# Patient Record
Sex: FEMALE | Race: BLACK OR AFRICAN AMERICAN | NOT HISPANIC OR LATINO | URBAN - METROPOLITAN AREA
[De-identification: names, ages, dates, MRNs, and addresses within clinical notes are randomized per-mention and may not be internally consistent; named-entity substitution may affect disease eponyms.]

---

## 2020-05-02 ENCOUNTER — EMERGENCY (EMERGENCY)
Facility: HOSPITAL | Age: 65
LOS: 1 days | Discharge: HOME | End: 2020-05-02
Attending: EMERGENCY MEDICINE | Admitting: EMERGENCY MEDICINE
Payer: COMMERCIAL

## 2020-05-02 VITALS
WEIGHT: 164.91 LBS | DIASTOLIC BLOOD PRESSURE: 87 MMHG | HEART RATE: 99 BPM | OXYGEN SATURATION: 96 % | TEMPERATURE: 98 F | SYSTOLIC BLOOD PRESSURE: 190 MMHG | HEIGHT: 64 IN | RESPIRATION RATE: 16 BRPM

## 2020-05-02 PROCEDURE — 99284 EMERGENCY DEPT VISIT MOD MDM: CPT

## 2020-05-02 PROCEDURE — 70450 CT HEAD/BRAIN W/O DYE: CPT | Mod: 26

## 2020-05-02 NOTE — ED ADULT NURSE NOTE - OBJECTIVE STATEMENT
Patient presents with contusion to her forehead s/p fall and hitting her head on concrete wanda. Patient denies mental status changes or visual changes s/p fall. Patient reports that she was seen at urgent care prior to coming in today

## 2020-05-02 NOTE — ED PROVIDER NOTE - CLINICAL SUMMARY MEDICAL DECISION MAKING FREE TEXT BOX
Debbie: Tripped on curb. Multiple abrasions. Contusion to R forehead. No LOC/amnesia/N/V/neuro deficit. No neck pain. Meets [place] head CT rule. CT brain. Got tetanus shot today at urgent care.

## 2020-05-02 NOTE — ED ADULT NURSE NOTE - NSIMPLEMENTINTERV_GEN_ALL_ED
Implemented All Universal Safety Interventions:  [place] to call system. Call bell, personal items and telephone within reach. Instruct patient to call for assistance. Room bathroom lighting operational. Non-slip footwear when patient is off stretcher. Physically safe environment: no spills, clutter or unnecessary equipment. Stretcher in lowest position, wheels locked, appropriate side rails in place.

## 2020-05-02 NOTE — ED PROVIDER NOTE - OBJECTIVE STATEMENT
66 y/o female pmh htn on asa c/o mechanical fall w/ head trauma. Pt admits to tripping over a curb and falling forward and striking her forehead on the concrete. Pt denies LOC. Pt went to the urgent care and was sent to the ER for a head ct. Pt denies chest pain, sob, abd pain, n/v/d, numbness, tingling, weakness, dizziness, neck pain, fever or chills.

## 2020-05-02 NOTE — ED PROVIDER NOTE - PATIENT PORTAL LINK FT
You can access the FollowMyHealth Patient Portal offered by [facility] by registering at the following website: [email or web address]. By joining [facility]’s FollowMyHealth portal, you will also be able to view your health information using other applications (apps) compatible with our system.

## 2020-05-02 NOTE — ED PROVIDER NOTE - ATTENDING CONTRIBUTION TO CARE
I performed a face-to-face evaluation of the patient and performed a history and physical examination. I agree with the history and physical examination.    Tripped on curb. Multiple abrasions. Contusion to R forehead. No LOC/amnesia/N/V/neuro deficit. No neck pain. Meets [place] head CT rule. CT brain. Got tetanus shot today at urgent care.

## 2020-05-02 NOTE — ED ADULT TRIAGE NOTE - CHIEF COMPLAINT QUOTE
Pt tripped and fell hit head on the concrete contusion noted to forehead  denies LOC denies blood thinners   PMH: DM, HTN

## 2020-05-04 ENCOUNTER — TRANSCRIPTION ENCOUNTER (OUTPATIENT)
Age: 65
End: 2020-05-04

## 2020-06-04 PROBLEM — I10 ESSENTIAL (PRIMARY) HYPERTENSION: Chronic | Status: ACTIVE | Noted: 2020-05-02

## 2020-06-11 ENCOUNTER — APPOINTMENT (OUTPATIENT)
Dept: CT IMAGING | Facility: IMAGING CENTER | Age: 65
End: 2020-06-11

## 2021-08-26 NOTE — ED ADULT NURSE NOTE - SUICIDE SCREENING QUESTION 3
Ok to resend medication?    Routed to Dr. Crawford  Electronically Signed by:    KIERRA Jones     No

## 2022-04-25 NOTE — ED ADULT TRIAGE NOTE - INTERNATIONAL TRAVEL
Apply ice and heat.  Use pain patches as directed.  Tylenol naproxen for pain.  Flexeril for muscle spasm.  Follow-up with your primary care physician.  Return with any new or worsening symptoms.  
No